# Patient Record
Sex: MALE | Race: WHITE
[De-identification: names, ages, dates, MRNs, and addresses within clinical notes are randomized per-mention and may not be internally consistent; named-entity substitution may affect disease eponyms.]

---

## 2021-12-20 ENCOUNTER — HOSPITAL ENCOUNTER (EMERGENCY)
Dept: HOSPITAL 97 - ER | Age: 61
Discharge: HOME | End: 2021-12-20
Payer: COMMERCIAL

## 2021-12-20 VITALS — SYSTOLIC BLOOD PRESSURE: 146 MMHG | OXYGEN SATURATION: 96 % | DIASTOLIC BLOOD PRESSURE: 82 MMHG

## 2021-12-20 VITALS — TEMPERATURE: 98.1 F

## 2021-12-20 DIAGNOSIS — I10: ICD-10-CM

## 2021-12-20 DIAGNOSIS — N13.2: Primary | ICD-10-CM

## 2021-12-20 LAB
ALBUMIN SERPL BCP-MCNC: 3.4 G/DL (ref 3.4–5)
ALP SERPL-CCNC: 131 U/L (ref 45–117)
ALT SERPL W P-5'-P-CCNC: 34 U/L (ref 12–78)
AST SERPL W P-5'-P-CCNC: 20 U/L (ref 15–37)
BUN BLD-MCNC: 24 MG/DL (ref 7–18)
GLUCOSE SERPLBLD-MCNC: 83 MG/DL (ref 74–106)
HCT VFR BLD CALC: 42.4 % (ref 39.6–49)
INR BLD: 0.91
LYMPHOCYTES # SPEC AUTO: 1.3 K/UL (ref 0.7–4.9)
MAGNESIUM SERPL-MCNC: 2.5 MG/DL (ref 1.8–2.4)
NT-PROBNP SERPL-MCNC: 86 PG/ML (ref ?–125)
PMV BLD: 6.8 FL (ref 7.6–11.3)
POTASSIUM SERPL-SCNC: 4.5 MMOL/L (ref 3.5–5.1)
RBC # BLD: 4.65 M/UL (ref 4.33–5.43)
TROPONIN (EMERG DEPT USE ONLY): < 0.02 NG/ML (ref 0–0.04)

## 2021-12-20 PROCEDURE — 99284 EMERGENCY DEPT VISIT MOD MDM: CPT

## 2021-12-20 PROCEDURE — 71045 X-RAY EXAM CHEST 1 VIEW: CPT

## 2021-12-20 PROCEDURE — 83735 ASSAY OF MAGNESIUM: CPT

## 2021-12-20 PROCEDURE — 81015 MICROSCOPIC EXAM OF URINE: CPT

## 2021-12-20 PROCEDURE — 36415 COLL VENOUS BLD VENIPUNCTURE: CPT

## 2021-12-20 PROCEDURE — 96365 THER/PROPH/DIAG IV INF INIT: CPT

## 2021-12-20 PROCEDURE — 81003 URINALYSIS AUTO W/O SCOPE: CPT

## 2021-12-20 PROCEDURE — 93005 ELECTROCARDIOGRAM TRACING: CPT

## 2021-12-20 PROCEDURE — 74018 RADEX ABDOMEN 1 VIEW: CPT

## 2021-12-20 PROCEDURE — 85610 PROTHROMBIN TIME: CPT

## 2021-12-20 PROCEDURE — 84484 ASSAY OF TROPONIN QUANT: CPT

## 2021-12-20 PROCEDURE — 80048 BASIC METABOLIC PNL TOTAL CA: CPT

## 2021-12-20 PROCEDURE — 83880 ASSAY OF NATRIURETIC PEPTIDE: CPT

## 2021-12-20 PROCEDURE — 85025 COMPLETE CBC W/AUTO DIFF WBC: CPT

## 2021-12-20 PROCEDURE — 80076 HEPATIC FUNCTION PANEL: CPT

## 2021-12-20 NOTE — XMS REPORT
Continuity of Care Document

                          Created on:2021



Patient:MORAIMA LUIS

Sex:Male

:1960

External Reference #:[id number]





Demographics







                          Address                   [address] [place] [address]

 

                          Home Phone                [phone]

 

                          Email Address             [email or web address]

 

                          Preferred Language        Unknown

 

                          Marital Status            Unknown

 

                          Orthodoxy Affiliation     Unknown

 

                          Race                      Unknown

 

                          Additional Race(s)        Unavailable



                                                    Unavailable

 

                          Ethnic Group              Unknown









Author







                          Organization              [facility]

t

 

                          Address                   [address]



                                                    [place] [address]

 

                          Phone                     [phone]









Support







                Name            Relationship    Address         Phone

 

                Unavailable     Unavailable     [address] [place] [address] 









Care Team Providers







                    Name                Role                Phone

 

                    DOMENIC BANSAL      Attending Clinician Unavailable

 

                    HUNDL               Attending Clinician Unavailable

 

                    Domenic Bansal MD   Attending Clinician [phone]

 

                    Doctor Unassigned,  Name Attending Clinician Unavailable

 

                    Only,  Test         Attending Clinician Unavailable

 

                    Pob,  Lab Main      Attending Clinician Unavailable

 

                    DOMENIC BANSAL      Admitting Clinician Unavailable

 

                    Domenic Bansal MD   Admitting Clinician [phone]









Payers







           Payer Name Policy Type Policy Number Effective Date Expiration Date EVI small

 

           [facility]            [id number] 2017 00:00:00            



           [facility]                                                 







Problems







       Condition Condition Condition Status Onset  Resolution Last   Treating Co

mments 

Source



       Name   Details Category        Date   Date   Treatment Clinician        



                                                 Date                 

 

       No known No known Disease                                           Unive

rs



       active active                                                  ity of



       problems problems                                                  [facility]







Allergies, Adverse Reactions, Alerts







       Allergy Allergy Status Severity Reaction(s) Onset  Inactive Treating Comm

ents 

Source



       Name   Type                        Date   Date   Clinician        

 

       NO KNOWN Drug   Active                                           [facility]



       ALLERGIE Class                                                   ity of



       S                                                              [facility]







Social History







           Social Habit Start Date Stop Date  Quantity   Comments   Source

 

           Exposure to                       Not sure              [facility]

 [place]



           SARS-CoV-2 (event)                                             [facility]

l [facility]

 

           Tobacco use and 2021 Never used            [facility]



           exposure   00:00:00   00:00:00                         [facility] [place]

 

           Sex Assigned At 1960                       [facility]



           Birth      00:00:00   00:00:00                         [facility]









                Smoking Status  Start Date      Stop Date       Source

 

                Unknown if ever smoked                                 [facility]

 

                Never smoker                                    [facility]







Medications







       Ordered Filled Start  Stop   Current Ordering Indication Dosage Frequency

 Signature

                    Comments            Components          Source



     Medication Medication Date Date Medication? Clinician                (SIG) 

          



     Name Name                                                   

 

     linezolid      0      Yes            600mg      Take 600           Uni

vers



     600 mg      6-10                               mg by           ity of



     tablet      13:49:                               mouth           [place]



               55                                 every 12           Medical



                                                  (twelve)           Branch



                                                  hours.           

 

     metFORMIN      -0      Yes            500mg      Take 500           Uni

vers



     500 mg 24      6-10                               mg by           ity of



     hr tablet      13:49:                               mouth           [place]



               55                                 daily with           Medical



                                                  breakfast.           Branch

 

     ramipriL 10      0      Yes            10mg      Take 10 mg           

[facility]



     mg capsule      6-10                               by mouth           ity o

f



               13:49:                               daily.           [place]



               55                                                Medical



                                                                 Branch

 

     liraglutide      0      Yes                      inject           Univ

ers



     (VICTOZA      6-10                               under the           ity of



     2-MONICA) 0.6      13:49:                               skin.           [place]



     mg/0.1 mL      55                                                Medical



     (18 mg/3                                                        Branch



     mL)                                                         



     injection                                                        

 

     allopurinoL      -0      Yes            300mg      Take 300           U

nivers



     300 mg      6-10                               mg by           ity of



     tablet      13:49:                               mouth           [place]



               55                                 daily.           Medical



                                                                 Branch

 

     amLODIPine      0      Yes            5mg       Take 5 mg           Un

carole



     5 mg tablet      6-10                               by mouth           ity 

of



               13:49:                               daily.           [place]



               55                                                Medical



                                                                 Branch

 

     carvediloL      0      Yes            12.5mg      Take 12.5           

[facility]



     12.5 mg      6-10                               mg by           ity of



     tablet      13:49:                               mouth 2           [place]



               55                                 (two)           Medical



                                                  times           Branch



                                                  daily with           



                                                  meals.           

 

     linezolid      0      Yes            600mg      Take 600           Uni

vers



     600 mg      6-10                               mg by           ity of



     tablet      13:49:                               mouth           [place]



               55                                 every 12           Medical



                                                  (twelve)           Branch



                                                  hours.           

 

     metFORMIN      0      Yes            500mg      Take 500           Uni

vers



     500 mg 24      6-10                               mg by           ity of



     hr tablet      13:49:                               mouth           [place]



               55                                 daily with           Medical



                                                  breakfast.           Branch

 

     ramipriL 10      0      Yes            10mg      Take 10 mg           

[facility]



     mg capsule      6-10                               by mouth           ity o

f



               13:49:                               daily.           [place]



               55                                                Medical



                                                                 Branch

 

     liraglutide      0      Yes                      inject           Univ

ers



     (VICTOZA      6-10                               under the           ity of



     2-MONICA) 0.6      13:49:                               skin.           [place]



     mg/0.1 mL      55                                                Medical



     (18 mg/3                                                        Branch



     mL)                                                         



     injection                                                        

 

     allopurinoL      -0      Yes            300mg      Take 300           U

nivers



     300 mg      6-10                               mg by           ity of



     tablet      13:49:                               mouth           [place]



               55                                 daily.           Medical



                                                                 Branch

 

     amLODIPine      0      Yes            5mg       Take 5 mg           Un

carole



     5 mg tablet      6-10                               by mouth           ity 

of



               13:49:                               daily.           [place]



               55                                                Medical



                                                                 Branch

 

     carvediloL      0      Yes            12.5mg      Take 12.5           

[facility]



     12.5 mg      6-10                               mg by           ity of



     tablet      13:49:                               mouth 2           [place]



               55                                 (two)           Medical



                                                  times           Branch



                                                  daily with           



                                                  meals.           

 

     linezolid      0      Yes            600mg      Take 600           Uni

vers



     600 mg      6-10                               mg by           ity of



     tablet      13:49:                               mouth           [place]



               55                                 every 12           Medical



                                                  (twelve)           Branch



                                                  hours.           

 

     metFORMIN      0      Yes            500mg      Take 500           Uni

vers



     500 mg 24      6-10                               mg by           ity of



     hr tablet      13:49:                               mouth           [place]



               55                                 daily with           Medical



                                                  breakfast.           Branch

 

     ramipriL 10            Yes            10mg      Take 10 mg           

[facility]



     mg capsule      6-10                               by mouth           ity o

f



               13:49:                               daily.           [place]



               55                                                Medical



                                                                 Branch

 

     liraglutide            Yes                      inject           Univ

ers



     (VICTOZA      6-10                               under the           ity of



     2-MONICA) 0.6      13:49:                               skin.           [place]



     mg/0.1 mL      55                                                Medical



     (18 mg/3                                                        Branch



     mL)                                                         



     injection                                                        

 

     allopurinoL            Yes            300mg      Take 300           U

nivers



     300 mg      6-10                               mg by           ity of



     tablet      13:49:                               mouth           [place]



               55                                 daily.           Medical



                                                                 Branch

 

     amLODIPine      0      Yes            5mg       Take 5 mg           Un

carole



     5 mg tablet      6-10                               by mouth           ity 

of



               13:49:                               daily.           [place]



               55                                                Medical



                                                                 Branch

 

     carvediloL            Yes            12.5mg      Take 12.5           

[facility]



     12.5 mg      6-10                               mg by           ity of



     tablet      13:49:                               mouth 2           [place]



               55                                 (two)           Medical



                                                  times           [place]



                                                  daily with           



                                                  meals.           

 

     tetracaine      0      Yes                      PRN,           [facility]



     (PONTOCAINE      6-10                               Starting           ity 

of



     ) 0.5 %      13:15:                               Thu            [place]



     ophthalmic      00                                 6/10/21 at           Med

ical



     drops                                         0815,           Branch



                                                  Until           



                                                  Discontinu           



                                                  ed,            



                                                  Routine,           



                                                  Intra-op           

 

     tetracaine      -2021- No                       PRN,           Univer

s



     (PONTOCAINE      6-10 06-10                          Starting           ity

 of



     ) 0.5 %      13:15: 15:54                          Thu            [place]



     ophthalmic      00   :58                           6/10/21 at           Med

ical



     drops                                         0815,           [place]



                                                  Until Thu           



                                                  6/10/21 at           



                                                  1054,           



                                                  Routine,           



                                                  Intra-op           

 

     neomycin-po      -0      Yes                      PRN,           Univer

s



     lymyxin-dex      -10                               Starting           ity 

of



     amethasone      13:14:                               Thu            [place]



     (MAXITROL)      00                                 6/10/21 at           Med

ical



     [address]



     mg/g-10,000                                         Until           



     unit/g-0.1                                         Discontinu           



     %                                            ed,            



     ophthalmic                                         Routine,           



     ointment                                         Intra-op           

 

     gentamicin      -0      Yes                      PRN,           [facility]



     injection      -10                               Starting           ity of



               13:14:                               Thu            [place]



               00                                 6/10/21 at           [address]



                                                  Until           



                                                  Discontinu           



                                                  ed, ASAP,           



                                                  Intra-op           

 

     neomycin-po      -0 - No                       PRN,           Unive

rs



     lymyxin-dex      6-10 06-10                          Starting           ity

 of



     amethasone      13:14: 15:54                          Thu            [place]



     (MAXITROL)      00   :58                           6/10/21 at           Med

ical



     [address]



     mg/g-10,000                                         Until Thu           



     unit/g-0.1                                         6/10/21 at           



     %                                            1054,           



     ophthalmic                                         Routine,           



     ointment                                         Intra-op           

 

     gentamicin      -0 - No                       PRN,           Univer

s



     injection      6-10 06-10                          Starting           ity o

f



               13:14: 15:54                          Thu            [place]



               00   :58                           6/10/21 at           [address]



                                                  Until Thu           



                                                  6/10/21 at           



                                                  1054,           



                                                  ASAP,           



                                                  Intra-op           

 

     balanced      -0      Yes                      PRN,           [facility]



     salt soln      6-10                               Starting           ity of



     no.2 irrig.      13:01:                               Thu            [place]



     (BSS)      00                                 6/10/21 at           [address]



     solution                                         Until           



                                                  Discontinu           



                                                  ed,            



                                                  Routine,           



                                                  Intra-op           

 

     ceFAZolin            Yes                      PRN,           [facility]



     (ANCEF)      6-10                               Starting           ity of



     injection      13:01:                               Thu            [place]



               00                                 6/10/21 at           [address]



                                                  Until           



                                                  Discontinu           



                                                  ed, ASAP,           



                                                  Intra-op           

 

     dexamethaso            Yes                      PRN,           Univer

s



     ne        -10                               Starting           ity of



     (DECADRON      13:01:                               Thu            [place]



     PHOSPHATE)      00                                 6/10/21 at           [facility]

ical



     injection                                         [address] [place] [address]



                                                  Until           



                                                  Discontinu           



                                                  ed,            



                                                  Routine,           



                                                  Intra-op           

 

     DUOVISC            Yes                      PRN,           [facility]



     (DUOVISC      6-10                               Starting           ity of



     VISCO      13:01:                               Thu            [place]



     ELASTIC) 3      00                                 6/10/21 at           Med

ical



     %-4 %(0.5                                         0801,           Branch



     mL) 1 %                                         Until           



     (0.55 mL)                                         Discontinu           



     intraocular                                         ed,            



     injection                                         Routine,           



                                                  Intra-op           

 

     NaCl 0.9%            Yes                      PRN,           [facility]



     (NS)      6-10                               Starting           ity of



     injection      13:01:                               Thu            [place]



               00                                 6/10/21 at           [place] [address],           [place]



                                                  Until           



                                                  Discontinu           



                                                  ed,            



                                                  Routine,           



                                                  Intra-op           

 

     EPINEPHrine            Yes                      PRN,           Univer

s



     (PF)      6-10                               Starting           ity of



     1:1,000 (1      13:01:                               Thu            [place]



     mg/mL)      00                                 6/10/21 at           [facility]



     (ADRENALIN                                         08,           [place]



     (PF))                                         Until           



     injection                                         Discontinu           



                                                  ed,            



                                                  Routine,           



                                                  Intra-op           

 

     balanced      2021- No                       PRN,           [facility]



     salt soln      6-10 06-10                          Starting           ity o

f



     no.2 irrig.      13:01: 15:54                          Thu            [place]



     (BSS)      00   :58                           6/10/21 at           [facility]



     ophthalmic                                         08,           [place]



     solution                                         Until Thu           



                                                  6/10/21 at           



                                                  1054,           



                                                  Routine,           



                                                  Intra-op           

 

     ceFAZolin      2021- No                       PRN,           [facility]



     (ANCEF)      6-10 06-10                          Starting           ity of



     injection      13:01: 15:54                          Thu            [place]



               00   :58                           6/10/21 at           [place] [address],           Branch



                                                  Until Thu           



                                                  6/10/21 at           



                                                  1054,           



                                                  ASAP,           



                                                  Intra-op           

 

     dexamethaso      2021- No                       PRN,           Unive

rs



     ne        6-10 06-10                          Starting           ity of



     (DECADRON      13:01: 15:54                          Thu            [place]



     PHOSPHATE)      00   :58                           6/10/21 at           [facility]

ical



     injection                                         08,           Branch



                                                  Until Thu           



                                                  6/10/21 at           



                                                  1054,           



                                                  Routine,           



                                                  Intra-op           

 

     DUOVISC      2021- No                       PRN,           [facility]



     (DUOVISC      6-10 06-10                          Starting           ity of



     VISCO      13:01: 15:54                          Thu            [place]



     ELASTIC) 3      00   :58                           6/10/21 at           Med

ical



     %-4 %(0.5                                         08,           Branch



     mL) 1 %                                         Until Thu           



     (0.55 mL)                                         6/10/21 at           



     intraocular                                         1054,           



     injection                                         Routine,           



                                                  Intra-op           

 

     NaCl 0.9%      2021- No                       PRN,           [facility]



     (NS)      6-10 06-10                          Starting           ity of



     injection      13:01: 15:54                          Thu            [place]



               00   :58                           6/10/21 at           Medical



                                                  0801,           Branch



                                                  Until Thu           



                                                  6/10/21 at           



                                                  1054,           



                                                  Routine,           



                                                  Intra-op           

 

     EPINEPHrine      2021- No                       PRN,           Unive

rs



     (PF)      6-10 06-10                          Starting           ity of



     1:1,000 (1      13:01: 15:54                          Thu            [place]



     mg/mL)      00   :58                           6/10/21 at           [facility]



     (ADRENALIN                                         0801,           [place]



     (PF))                                         Until Thu           



     injection                                         6/10/21 at           



                                                  1054,           



                                                  Routine,           



                                                  Intra-op           

 

     water for            Yes                      PRN,           [facility]



     irrigation      6-10                               Starting           ity o

f



     irrigation      12:58:                               Thu            [place]



     solution      00                                 6/10/21 at           Medic

al



                                                  [facility],           [place]



                                                  Until           



                                                  Discontinu           



                                                  ed,            



                                                  Routine,           



                                                  Intra-op           

 

     water for      2021- No                       PRN,           [facility]



     irrigation      6-10 06-10                          Starting           ity 

of



     irrigation      12:58: 15:54                          Thu            [place]



     solution      00   :58                           6/10/21 at           Medic

al



                                                  0758,           [place]



                                                  Until Thu           



                                                  6/10/21 at           



                                                  1054,           



                                                  Routine,           



                                                  Intra-op           

 

     eye block            Yes                      PRN,           [facility]



     syringe 11      10                               Starting           ity o

f



     mL        12:54:                               Thu            [place]



               00                                 6/10/21 at           Medical



                                                  0754,           [place]



                                                  Until           



                                                  Discontinu           



                                                  ed,            



                                                  Intra-op           

 

     eye block      2021- No                       PRN,           [facility]



     syringe 11      6-10 06-10                          Starting           ity 

of



     mL        12:54: 15:54                          Thu            [place]



               00   :58                           6/10/21 at           Medical



                                                  0754,           [place]



                                                  Until Thu           



                                                  6/10/21 at           



                                                  1054,           



                                                  Intra-op           

 

     mydriatic      2021- No             .5mL      0.5 mL,           Univ

ers



     #5        6-10 06-10                          Left Eye,           ity of



     ophthalmic      12:15: 12:13                          ONCE, 1           [place]

as



     solution      00   :00                           dose, Thu           [facility]ica

l



     0.5 mL                                         6/10/21 at           [facility]



     syringe                                         0715,           



                                                  Routine,           



                                                  [facility] Pre-op           

 

     lactated      -0 202- No             1000mL      at 42           Unive

rs



     ringers IV      6-10 06-10                          mL/hr,           ity of



     infusion      12:15: 12:14                          1,000 mL,           [facility]

as



     1,000 mL      00   :00                           IV             Medical



                                                  Infusion,           [facility]



                                                  ONCE, 1           



                                                  dose, Thu           



                                                  6/10/21 at           



                                                  0715,           



                                                  Routine,           



                                                  [facility] Pre-op           

 

     mydriatic      2021- No             .5mL      0.5 mL,           Univ

ers



     #5        6-10 06-10                          Left Eye,           ity of



     ophthalmic      12:15: 12:13                          ONCE, 1           [facility]

as



     solution      00   :00                           dose, Thu           Medica

l



     0.5 mL                                         6/10/21 at           [facility]



     syringe                                         0715,           



                                                  Routine,           



                                                  DS[facility] Pre-op           

 

     lactated      -0 - No             1000mL      at 42           Unive

rs



     ringers IV      6-10 06-10                          mL/hr,           ity of



     infusion      12:15: 12:14                          1,000 mL,           [facility]

as



     1,000 mL      00   :00                           IV             Medical



                                                  Infusion,           [facility]



                                                  ONCE, 1           



                                                  dose, Thu           



                                                  6/10/21 at           



                                                  0715,           



                                                  Routine,           



                                                  DS[facility] Pre-op           

 

     carvediloL      0      Yes            12.5mg      Take 12.5           

[facility]



     12.5 mg      5-13                               mg by           ity of



     tablet      15:18:                               mouth 2           [place]



               29                                 (two)           Medical



                                                  times           Branch



                                                  daily with           



                                                  meals.           

 

     linezolid            Yes            600mg      Take 600           Uni

vers



     600 mg      5-13                               mg by           ity of



     tablet      15:18:                               mouth           [place]



               29                                 every 12           Medical



                                                  (twelve)           Branch



                                                  hours.           

 

     metFORMIN      0      Yes            500mg      Take 500           Uni

vers



     500 mg 24      5-13                               mg by           ity of



     hr tablet      15:18:                               mouth           [place]



               29                                 daily with           Medical



                                                  breakfast.           Branch

 

     ramipriL 10      0      Yes            10mg      Take 10 mg           

[facility]



     mg capsule      5-13                               by mouth           ity o

f



               15:18:                               daily.           [place]



               29                                                Medical



                                                                 Branch

 

     liraglutide      0      Yes                      inject           Univ

ers



     (VICTOZA      5-13                               under the           ity of



     2-MONICA) 0.6      15:18:                               skin.           [place]



     mg/0.1 mL      29                                                Medical



     (18 mg/3                                                        Branch



     mL)                                                         



     injection                                                        

 

     allopurinoL      -0      Yes            300mg      Take 300           U

nivers



     300 mg      5-13                               mg by           ity of



     tablet      15:18:                               mouth           [place]



               29                                 daily.           Medical



                                                                 Branch

 

     amLODIPine      -0      Yes            5mg       Take 5 mg           Un

carole



     5 mg tablet      5-13                               by mouth           ity 

of



               15:18:                               daily.           [place]



               29                                                Medical



                                                                 Branch

 

     carvediloL      -0      Yes            12.5mg      Take 12.5           

[facility]



     12.5 mg      5-13                               mg by           ity of



     tablet      15:18:                               mouth 2           [place]



               29                                 (two)           Medical



                                                  times           Branch



                                                  daily with           



                                                  meals.           

 

     linezolid      -0      Yes            600mg      Take 600           Uni

vers



     600 mg      5-13                               mg by           ity of



     tablet      15:18:                               mouth           [place]



               29                                 every 12           Medical



                                                  (twelve)           Branch



                                                  hours.           

 

     metFORMIN      -0      Yes            500mg      Take 500           Uni

vers



     500 mg 24      5-13                               mg by           ity of



     hr tablet      15:18:                               mouth           [place]



               29                                 daily with           Medical



                                                  breakfast.           Branch

 

     ramipriL 10      -0      Yes            10mg      Take 10 mg           

[facility]



     mg capsule      5-13                               by mouth           ity o

f



               15:18:                               daily.           [address]



                                                                 Branch

 

     liraglutide      0      Yes                      inject           Univ

ers



     (VICTOZA      5-13                               under the           ity of



     2-MONICA) 0.6      15:18:                               skin.           [place]



     mg/0.1 mL      29                                                Medical



     (18 mg/3                                                        Branch



     mL)                                                         



     injection                                                        

 

     allopurinoL      -0      Yes            300mg      Take 300           U

nivers



     300 mg      5-13                               mg by           ity of



     tablet      15:18:                               mouth           [place]



               29                                 daily.           Medical



                                                                 Branch

 

     amLODIPine      -0      Yes            5mg       Take 5 mg           Un

carole



     5 mg tablet      5-13                               by mouth           ity 

of



               15:18:                               daily.           [address]



                                                                 Branch

 

     carvediloL      -0      Yes            12.5mg      Take 12.5           

[facility]



     12.5 mg      5-13                               mg by           ity of



     tablet      15:18:                               mouth 2           [place]



               29                                 (two)           Medical



                                                  times           Branch



                                                  daily with           



                                                  meals.           

 

     linezolid      -0      Yes            600mg      Take 600           Uni

vers



     600 mg      5-13                               mg by           ity of



     tablet      15:18:                               mouth           [place]



               29                                 every 12           Medical



                                                  (twelve)           Branch



                                                  hours.           

 

     metFORMIN      -0      Yes            500mg      Take 500           Uni

vers



     500 mg 24      5-13                               mg by           ity of



     hr tablet      15:18:                               mouth           [place]



               29                                 daily with           Medical



                                                  breakfast.           Branch

 

     ramipriL 10      -0      Yes            10mg      Take 10 mg           

[facility]



     mg capsule      5-13                               by mouth           ity o

f



               15:18:                               daily.           [address]

 

     liraglutide            Yes                      inject           Univ

ers



     (VICTOZA      5-13                               under the           ity of



     2-MONICA) 0.6      15:18:                               skin.           [place]



     mg/0.1 mL                                                      Medical



     (18 mg/3                                                        Branch



     mL)                                                         



     injection                                                        

 

     allopurinoL            Yes            300mg      Take 300           U

nivers



     300 mg      5-13                               mg by           ity of



     tablet      15:18:                               mouth           [place]



               29                                 daily.           [facility]



                                                                 Branch

 

     amLODIPine            Yes            5mg       Take 5 mg           Un

carole



     5 mg tablet      5-13                               by mouth           ity 

of



               15:18:                               daily.           [address]

 

     water for            Yes                      PRN,           [facility]



     irrigation      5-13                               Starting           ity o

f



     irrigation      14:42:                               Thu            [place]



     solution      21 at           Medic

al



                                                  0942,           Branch



                                                  Until           



                                                  Discontinu           



                                                  ed,            



                                                  Routine,           



                                                  Intra-op           

 

     NaCl 0.9%            Yes                      PRN,           [facility]



     (NS)      5-                               Starting           ity of



     injection      14:42:                               Thu            [place]



               21 at           [address]



                                                  Until           



                                                  Discontinu           



                                                  ed,            



                                                  Routine,           



                                                  Intra-op           

 

     neomycin-po            Yes                      PRN,           Univer

s



     lymyxin-dex      -                               Starting           ity 

of



     amethasone      14:42:                               Thu            [place]



     (MAXITROL)      21 at           Med

ical



     3.5                                          [address] [place] [address]



     mg/g-10,000                                         Until           



     unit/g-0.1                                         Discontinu           



     %                                            ed,            



     ophthalmic                                         Routine,           



     ointment                                         Intra-op           

 

     gentamicin            Yes                      PRN,           [facility]



     injection      -                               Starting           ity of



               14:42:                               Thu            [place]



               00                                 21 at           [address]



                                                  Until           



                                                  Discontinu           



                                                  ed, ASAP,           



                                                  Intra-op           

 

     EPINEPHrine            Yes                      PRN,           Univer

s



     (PF)      5-                               Starting           ity of



     1:1,000 (1      14:42:                               Thu            [place]



     mg/mL)      21 at           [facility]



     (ADRENALIN                                         [address] [place] [address]



     (PF))                                         Until           



     injection                                         Discontinu           



                                                  ed,            



                                                  Routine,           



                                                  Intra-op           

 

     DUOVISC            Yes                      PRN,           [facility]



     (DUOVISC      5-13                               Starting           ity of



     VISCO      14:42:                               Thu            [place]



     ELASTIC) 3      21 at           Med

ical



     %-4 %(0.5                                         [address] [place] [address]



     mL) 1 %                                         Until           



     (0.55 mL)                                         Discontinu           



     intraocular                                         ed,            



     injection                                         Routine,           



                                                  Intra-op           

 

     dexamethaso      0      Yes                      PRN,           Univer

s



     ne        13                               Starting           ity of



     (DECADRON      14:42:                               Thu            [place]



     PHOSPHATE)      00                                 21 at           Med

ical



     injection                                         0942,           Branch



                                                  Until           



                                                  Discontinu           



                                                  ed,            



                                                  Routine,           



                                                  Intra-op           

 

     dexamethaso      2021- No                       PRN,           Unive

rs



     ne        13                          Starting           ity of



     (DECADRON      14:42: 17:18                          Thu            [place]



     PHOSPHATE)      00   :30                           21 at           Med

ical



     injection                                         0942,           Branch



                                                  Until u           



                                                  21 at           



                                                  1218,           



                                                  Routine,           



                                                  Intra-op           

 

     ondansetron            Yes            4mg       4 mg, Slow           

[facility]



     (ZOFRAN                                     IV Push,           ity of



     (PF))      14:31:                               PRN, 1           [place]



     injection 4      20                                 dose,           Medical



     mg                                           Starting           [facility]



                                                  u            



                                                  21 at           



                                                  0931,           



                                                  Until           



                                                  Discontinu           



                                                  ed,            



                                                  Routine,           



                                                  Nausea and           



                                                  Vomiting           



                                                  (N/V),           



                                                  PACU           

 

     FENTanyl PF            Yes            25ug      25 mcg,           Uni

vers



     (SUBLIMAZE                                     Slow IV           ity of



     (PF))      14:31:                               Push,           [place]



     injection      20                                 Q5MIN PRN,           Medi

oumou



     25 mcg                                         4 doses,           [facility]



                                                  Starting           



                                                  u            



                                                  21 at           



                                                  0931,           



                                                  Until           



                                                  Discontinu           



                                                  ed,            



                                                  Routine,           



                                                  Pain           



                                                  (scale           



                                                  4-6), PACU           

 

     ondansetron      2021- No             4mg       4 mg, Slow          

 [facility]



     (ZOFRAN                                IV Push,           ity of



     (PF))      14:31: 17:18                          PRN, 1           [place]



     injection 4      20   :30                           dose,           Medical



     mg                                           Starting           [facility]



                                                  u            



                                                  21 at           



                                                  0931,           



                                                  Until Thu           



                                                  21 at           



                                                  1218,           



                                                  Routine,           



                                                  Nausea and           



                                                  Vomiting           



                                                  (N/V),           



                                                  PACU           

 

     FENTanyl PF      2021- No             25ug      25 mcg,           Un

carole



     (SUBLIMAZE                                Slow IV           ity o

f



     (PF))      14:31: 17:18                          Push,           [place]



     injection      20   :30                           Q5MIN PRN,           Medi

oumou



     25 mcg                                         4 doses,           [facility]



                                                  Starting           



                                                  u            



                                                  21 at           



                                                  0931,           



                                                  Until Thu           



                                                  21 at           



                                                  1218,           



                                                  Routine,           



                                                  Pain           



                                                  (scale           



                                                  4-6), PACU           

 

     balanced      0      Yes                      PRN,           [facility]



     salt soln      5-13                               Starting           ity of



     no.2 irrig.      14:16:                               Thu            [place]



     (BSS)      00                                 21 at           [address]



     solution                                         Until           



                                                  Discontinu           



                                                  ed,            



                                                  Routine,           



                                                  Intra-op           

 

     ceFAZolin      0      Yes                      PRN,           [facility]



     (ANCEF)      5-13                               Starting           ity of



     injection      14:16:                               Thu            [place]



               21 at           [address]



                                                  Until           



                                                  Discontinu           



                                                  ed, ASAP,           



                                                  Intra-op           

 

     balanced      0      Yes                      PRN,           [facility]



     salt soln      5-13                               Starting           ity of



     no.2 irrig.      14:16:                               Thu            [place]



     (BSS)      21 at           [address]



     solution                                         Until           



                                                  Discontinu           



                                                  ed,            



                                                  Routine,           



                                                  Intra-op           

 

     ceFAZolin      -2021- No                       PRN,           [facility]



     (ANCEF)       05-                          Starting           ity of



     injection      14:16: 17:18                          Thu            [place]



               00   :30                           21 at           [address]



                                                  Until u           



                                                  21 at           



                                                  1218,           



                                                  ASAP,           



                                                  Intra-op           

 

     balanced      2021- No                       PRN,           [facility]



     salt soln      -                          Starting           ity o

f



     no.2 irrig.      14:16: 17:18                          Thu            [place]



     (BSS)      00   :30                           21 at           [address]



     solution                                         Until u           



                                                  21 at           



                                                  1218,           



                                                  Routine,           



                                                  Intra-op           

 

     tetracaine      0      Yes                      PRN,           [facility]



     (PONTOCAINE                                     Starting           ity 

of



     ) 0.5 %      14:10:                               u            [place]



     ophthalmic      00                                 21 at           [facility]

ical



     drops                                         [address] [place] [address]



                                                  Until           



                                                  Discontinu           



                                                  ed,            



                                                  Routine,           



                                                  Intra-op           

 

     eye block      -0      Yes                      PRN,           [facility]



     syringe 11      5-13                               Starting           ity o

f



     mL        14:10:                               Thu            [place]



               21 at           [address]



                                                  Until           



                                                  Discontinu           



                                                  ed,            



                                                  Intra-op           

 

     eye block      -0 - No                       PRN,           [facility]



     syringe 11      5- 05-13                          Starting           ity 

of



     mL        14:10: 17:18                          Thu            [place]



               00   :30                           21 at           Medical



                                                  0910,           Branch



                                                  Until Thu           



                                                  21 at           



                                                  1218,           



                                                  Intra-op           

 

     mydriatic      2021- No             .5mL      0.5 mL,           Univ

ers



     #5                                  Right Eye,           ity of



     ophthalmic      13:15: 13:22                          ONCE, 1           [place]

as



     solution      00   :00                           dose, Thu           Medica

l



     0.5 mL                                         21 at           [facility]



     syringe                                         0815,           



                                                  Routine,           



                                                  DSU Pre-op           

 

     lactated      2021- No             1000mL      at 42           Unive

rs



     ringers IV      -                          mL/hr,           ity of



     infusion      13:15: :22                          1,000 mL,           [facility]

as



     1,000 mL      00   :00                           IV             Medical



                                                  Infusion,           [facility]



                                                  ONCE, 1           



                                                  dose, Thu           



                                                  21 at           



                                                  0815,           



                                                  Routine,           



                                                  DSU Pre-op           

 

     mydriatic      2021- No             .5mL      0.5 mL,           Univ

ers



     #5                                  Right Eye,           ity of



     ophthalmic      13:15: :22                          ONCE, 1           [facility]

as



     solution      00   :00                           dose, Thu           Medica

l



     0.5 mL                                         21 at           Branch



     syringe                                         0815,           



                                                  Routine,           



                                                  DSU Pre-op           

 

     lactated      2021- No             1000mL      at 42           Unive

rs



     ringers IV      -                          mL/hr,           ity of



     infusion      13:15: :22                          1,000 mL,           [facility]

as



     1,000 mL      00   :00                           IV             Medical



                                                  Infusion,           Branch



                                                  ONCE, 1           



                                                  dose, Thu           



                                                  21 at           



                                                  0815,           



                                                  Routine,           



                                                  DSU Pre-op           

 

     ramipriL 10            Yes            10mg      Take 10 mg           

[facility]



     mg capsule      5-11                               by mouth           ity o

f



               13:45:                               daily.           [place]



               05                                                Medical



                                                                 Branch

 

     liraglutide            Yes                      inject           Univ

ers



     (VICTOZA      5-11                               under the           ity of



     2-MONICA) 0.6      13:45:                               skin.           [place]



     mg/0.1 mL      05                                                Medical



     (18 mg/3                                                        Branch



     mL)                                                         



     injection                                                        

 

     allopurinoL            Yes            300mg      Take 300           U

nivers



     300 mg      5-11                               mg by           ity of



     tablet      13:45:                               mouth           [place]



               04                                 daily.           Medical



                                                                 Branch

 

     amLODIPine      0      Yes            5mg       Take 5 mg           Un

carole



     5 mg tablet      5-11                               by mouth           ity 

of



               13:45:                               daily.           [place]



               04                                                Medical



                                                                 Branch

 

     carvediloL            Yes            12.5mg      Take 12.5           

[facility]



     12.5 mg      5-11                               mg by           ity of



     tablet      13:45:                               mouth 2           [place]



               04                                 (two)           Medical



                                                  times           Branch



                                                  daily with           



                                                  meals.           

 

     linezolid            Yes            600mg      Take 600           Uni

vers



     600 mg      5-11                               mg by           ity of



     tablet      13:45:                               mouth           [place]



               04                                 every 12           Medical



                                                  (twelve)           Branch



                                                  hours.           

 

     metFORMIN            Yes            500mg      Take 500           Uni

vers



     500 mg 24      5-11                               mg by           ity of



     hr tablet      13:45:                               mouth           [place]



               04                                 daily with           Medical



                                                  breakfast.           Branch







Immunizations







           Ordered    Filled Immunization Date       Status     Comments   [facility]



           Immunization Name Name                                        

 

           SARS-COV-2 COVID-19            2021 Completed             Unive

rsity of



           MODERNA VACCINE            00:00:00                         [facility]

 

           SARS-COV-2 COVID-19            2021 Completed             Unive

rsity of



           MODERNA VACCINE            00:00:00                         [facility]

 

           SARS-COV-2 COVID-19            2021 Completed             Unive

rsity of



           MODERNA VACCINE            00:00:00                         [facility]

 

           SARS-COV-2 COVID-19            2021 Completed             Unive

rsity of



           MODERNA VACCINE            00:00:00                         [facility]

 

           SARS-COV-2 COVID-19            2021 Completed             Unive

rsity of



           MODERNA VACCINE            00:00:00                         [facility]

 

           SARS-COV-2 COVID-19            2021 Completed             Unive

rsity of



           MODERNA VACCINE            00:00:00                         [facility]







Vital Signs







             Vital Name   Observation Time Observation Value Comments     Source

 

             Systolic blood 2021-06-10 13:36:00 121 mm[Hg]                Univer

sity of



             pressure                                            [facility]

 

             Diastolic blood 2021-06-10 13:36:00 75 mm[Hg]                 Unive

rsity of



             pressure                                            [facility]

 

             Heart rate   2021-06-10 13:36:00 62 /min                   Univers[facility]

ty of



                                                                 [facility]

 

             Respiratory rate 2021-06-10 13:36:00 13 /min                   Univ

ersity of



                                                                 [facility]

 

             Oxygen saturation in 2021-06-10 13:36:00 99 /min                   

[facility]



             Arterial blood by                                        [place] [facility]

oumou



             Pulse oximetry                                        Branch

 

             Body temperature 2021-06-10 13:21:00 36.67 Shannon                 Univ

ersity of



                                                                 [place] [facility]



                                                                 Branch

 

             Body height  2021 16:51:00 167.6 cm                  [facility]

ty of



                                                                 [place] [facility]



                                                                 Branch

 

             Body weight  2021 16:51:00 102 kg                    [facility]

ty of



                                                                 [place] [facility]



                                                                 Branch

 

             BMI          2021 16:51:00 36.31 kg/m2               [facility]

ty of



                                                                 [place] [facility]



                                                                 Branch

 

             Systolic blood 2021-06-10 13:36:00 121 mm[Hg]                Univer

sity of



             pressure                                            [place] [facility]



                                                                 Branch

 

             Diastolic blood 2021-06-10 13:36:00 75 mm[Hg]                 Unive

rsity of



             pressure                                            [place] [facility]



                                                                 Branch

 

             Heart rate   2021-06-10 13:36:00 62 /min                   [facility]

ty of



                                                                 [place] [facility]



                                                                 Branch

 

             Respiratory rate 2021-06-10 13:36:00 13 /min                   Univ

ersity of



                                                                 [place] [facility]



                                                                 Branch

 

             Oxygen saturation in 2021-06-10 13:36:00 99 /min                   

[facility]



             Arterial blood by                                        [place] [facility]

oumou



             Pulse oximetry                                        Branch

 

             Body temperature 2021-06-10 13:21:00 36.67 Shannon                 Univ

ersity of



                                                                 [place] [facility]



                                                                 Branch

 

             Body height  2021 16:51:00 167.6 cm                  [facility]

ty of



                                                                 [place] [facility]



                                                                 Branch

 

             Body weight  2021 16:51:00 102 kg                    [facility]

ty of



                                                                 [place] [facility]



                                                                 Branch

 

             BMI          2021 16:51:00 36.31 kg/m2               [facility]

ty of



                                                                 [place] [facility]



                                                                 Branch

 

             Systolic blood 2021 15:00:00 140 mm[Hg]                Univer

sity of



             pressure                                            [place] [facility]



                                                                 Branch

 

             Diastolic blood 2021 15:00:00 81 mm[Hg]                 Unive

rsity of



             pressure                                            [place] [facility]



                                                                 Branch

 

             Heart rate   2021 15:00:00 73 /min                   [facility]

ty of



                                                                 [place] [facility]



                                                                 Branch

 

             Respiratory rate 2021 15:00:00 14 /min                   Univ

ersity of



                                                                 [place] [facility]



                                                                 Branch

 

             Oxygen saturation in 2021 15:00:00 98 /min                   

[facility]



             Arterial blood by                                        [place] [facility]

oumou



             Pulse oximetry                                        Branch

 

             Body temperature 2021 14:30:00 36.83 Shannon                 Univ

ersity of



                                                                 [place] [facility]



                                                                 Branch

 

             Body height  2021 15:33:00 167.6 cm                  [facility]

ty of



                                                                 [place] [facility]



                                                                 Branch

 

             Body weight  2021 15:33:00 102 kg                    [facility]

 

             BMI          2021 15:33:00 36.29 kg/m2               [facility]

 

             Systolic blood 2021 15:00:00 140 mm[Hg]                Univer

sity of



             pressure                                            [facility]

 

             Diastolic blood 2021 15:00:00 81 mm[Hg]                 Unive

rsity of



             pressure                                            [facility]

 

             Heart rate   2021 15:00:00 73 /min                   [facility]

 

             Respiratory rate 2021 15:00:00 14 /min                   [facility]

 

             Oxygen saturation in 2021 15:00:00 98 /min                   

[facility]



             Arterial blood by                                        [place] [facility]

oumou



             Pulse oximetry                                        [place]

 

             Body temperature 2021 14:30:00 36.83 Shannon                 [facility]

 

             Body height  2021 15:33:00 167.6 cm                  [facility]

 

             Body weight  2021 15:33:00 102 kg                    [facility]

 

             BMI          2021 15:33:00 36.29 kg/m2               [facility]







Procedures







                Procedure       Date / Time     Performing      Source



                                Performed       Clinician       

 

                PHACOEMULSIFICATION CATARACT 2021-06-10      Maritza Bansal   [facility]

[facility] [place]



                                12:45:00        [facility] SURGERY - [facility] 2021-06-10      Doctor Unassigned, [facility]



                                05:01:00        [place]         [facility]

 

                PHACOEMULSIFICATION OF 2021      Maritza Bansal   [facility]



                CATARACT WITH INTRAOCULAR 14:01:00        Domenic Kingsley

l Branch



                LENS IMPLANT                                    

 

                POCT GLUCOSE(AGE >30DAYS) 2021      Yanelis Schroeder [facility]



                                13:20:00                        [facility]

 

                POCT GLUCOSE(AGE >30DAYS) 2021      Yanelis Schroeder [facility]



                                13:20:00                        [facility]

 

                POCT GLUCOSE (AUTOMATED) 2021      Maritza Bansal   [facility]



                                13:17:00        [facility]

 

                POCT GLUCOSE (AUTOMATED) 2021      Maritza Bansal   [facility]



                                13:17:00        [facility]

 

                DAY SURGERY - ADC 2021      Doctor Unassigned, [facility]



                                05:01:00        [place]         [facility]

 

                ASSIGNMENT OF BENEFITS 2021      Doctor Unassigned, [facility]



                                16:44:22        [place]         [facility]







Encounters







        Start   End     Encounter Admission Attending Care    Care    Encounter 

Source



        Date/Time Date/Time Type    Type    Clinicians Facility Department ID   

   

 

        2021-10-31         [facility]     [id number]

 [facility]



        22:29:47                         MARITZA                         john [facility]

 

        2021-10-31         [facility]     [id number]

 Univers



        17:00:13                         MARITZA mancia [facility]

 

        2021-10-07 2021-10-07 Outpatient         ROSEY OLIVA  [id number]

[id number] Rosey



        00:00:00 00:00:00                 IZABELLA hernández

 

        2021-10-07 2021-10-07 Outpatient         ROSEY OLIVA  [id number]

92 Rosey



        00:00:00 00:00:00                 IZABELLA hernández

 

        2021-06-10 2021-06-10 [facility]    1.2.840.114 [id number]

425 Univers



        07:06:00 08:45:00 Encounter         Maritza Ordonez 350.1.13.10        

 ity of



                                        [place] [id number]         [place]

[place]



                                                Surgical [phone]         [facility]  [address]             Branch

 

        2021-06-10 2021-06-10 Surgery         [facility]    1.2.840.114 [id number]

76 Univers



        08:00:00 08:39:00                 Maritza Ordonez 350.1.13.10         i

ty of



                                        [place] [id number]         [place]

[place]



                                                Surgical [phone]         [facility]  [address]             Branch

 

        2021-06-10 2021-06-10 Orders          Doctor IBARRA    1.2.840.114 [id number]

36 Univers



        00:00:00 00:00:00 Only            UnassignedVANNESA   350.1.13.10       

  ity of



                                        [place] [facility] [id number]         Nikko

as



                                                        [phone]         Medi

oumou



                                                        [address]             Branch

 

        2021 Outpatient R               [facility]    [id number]

-20 Univers



        10:30:00 10:30:00                                         [id number]  ity of



                                                                        [facility]

 

        20[facility]         [facility]    1.2.840.114 [id number]

368 Univers



        08:04:00 10:18:00 Encounter         Maritza Ordonez 350.1.13.10        

 ity of



                                        [place]omenic [place] 4.2.7.2.686         [place]

[place]



                                                Surgical [phone]         [facility]  [address]             Branch

 

        2021 Surgery         [facility]    1.2.840.114 [id number]

42 Univers



        09:30:00 10:06:00                 Maritza Ordonez 350.1.13.10         i

ty of



                                        [place] 4.2.7.2.686         [place]

[place]



                                                Surgical [phone]         [facility]  [address]             Branch

 

        2021 Orders          Doctor  STACEY    1.2.840.114 [id number]

44 Univers



        00:00:00 00:00:00 Only            Unassigned, VANNESA   350.1.13.10       

  ity of



                                        [place] [facility] [id number]         Nikko

as



                                                        [phone]         Medi

oumou



                                                        [address]             Branch

 

        2021 Outpatient R       ALANA   [facility]    [id number]

298 Univers



        10:00:00 10:00:00                 MARITZA mancia [facility]

 

        2021 Laboratory         Only, Adc Test [facility]    1.2.840.

114 [id number] 

Univers



        09:36:40 09:51:40 Only            Maritza Bansal 350.1.1

3.10         ity of



                                                [place] 4.2.7.2.686         [place]

[place]



                                                [place]  [phone]         Medi

oumou



                                                        [address]             Branch

 

        2021 Technician         Marry, Adc Lab Main [facility]    1.2.8

40.114 [id number] 

Univers



        11:45:18 12:00:18 Visit           Maritza Bansal 350.1.1

3.10         ity of



                                                [place] 4.2.7.2.686         [place]

[place]



                                                [place] [phone]         [facility]     353             [facility]                 

 

        2021 Outpatient R       ALANA   [facility]    [id number]

418 Univers



        11:45:00 11:45:00                 MARITZA                         itjohn of



                                                                        [facility]

 

        2021 Orders          Doctor  STACEY    1.2.840.114 [id number]

19 



        00:00:00 00:00:00 Only            Unassigned, VANNESA   350.1.13.10       

  ity of



                                        [place] [facility] [id number]         Nikko

as



                                                        [phone]         [address]







Results







           Test Description Test Time  Test Comments Results    Result Comments 

Source









                    POCT GLUCOSE (AUTOMATED) 2021 13:45:02 









                      Test Item  Value      Reference Range Interpretation Comme

nts









             POCT GLU (test code = 3959385084) 87 mg/dL                   

      

 

             Lab Interpretation (test code = 11406-7) Normal                    

             



[facility] GLUCOSE (AUTOMATED)2021 13:45:02





             Test Item    Value        Reference Range Interpretation Comments

 

             POCT GLU (test code = 6952860106) 87 mg/dL                   

      

 

             Lab Interpretation (test code = Normal                             

    



             38712-4)                                            



[facility] [id number] 13:20:00





             Test Item    Value        Reference Range Interpretation Comments

 

             POCT Glu (age>30days) (test code = 87 mg/dL                  

       



             3342)                                               



[facility] [id number] 13:20:00





             Test Item    Value        Reference Range Interpretation Comments

 

             POCT Glu (age>30days) (test code = 87 mg/dL                  

       



             3342)                                               



[facility]

## 2021-12-20 NOTE — ER
Nurse's Notes                                                                                     

 [facility]                                                                 

Name: Timoteo Tran                                                                            

Age: 61 yrs                                                                                       

Sex: Male                                                                                         

: 1960                                                                                   

MRN: [id number]                                                                                   

Arrival Date: 2021                                                                          

Time: 12:52                                                                                       

Account#: [id number]                                                                            

Bed 6                                                                                             

Private MD: Catherine Hagen                                                            

Diagnosis: Hydronephrosis with renal and ureteral calculous obstruction                           

                                                                                                  

Presentation:                                                                                     

                                                                                             

13:24 Chief complaint: Patient states: Sent for CT by Dr. Escamilla and found 10 mm kidney stone   jl7 

      on right side with hydronephrosis, Dr. Bourne wants me evaluated in the ER and is          

      going to do surgery tomorrow. Coronavirus screen: At this time, the client does not         

      indicate any symptoms associated with coronavirus-19. Ebola Screen: No symptoms or          

      risks identified at this time. Initial Sepsis Screen: Does the patient meet any 2           

      criteria? No. Patient's initial sepsis screen is negative. Does the patient have a          

      suspected source of infection? No. Patient's initial sepsis screen is negative. Risk        

      Assessment: Do you want to hurt yourself or someone else? Patient reports no desire to      

      harm self or others. Onset of symptoms is unknown. Care prior to arrival: None.             

13:24 Method Of Arrival: Ambulatory                                                           [facility] 

13:24 Acuity: JOVITA 3                                                                           jl7 

                                                                                                  

Triage Assessment:                                                                                

13:27 General: Appears in no apparent distress. uncomfortable, Behavior is calm, cooperative, jl7 

      appropriate for age. Pain: Complains of pain in right low back Pain currently is 3 out      

      of 10 on a pain scale.                                                                      

                                                                                                  

Historical:                                                                                       

- Allergies:                                                                                      

13:27 No Known Allergies;                                                                     jl7 

- PMHx:                                                                                           

13:27 Diabetes mellitus; Hypertensive disorder; gout; Chronic left hip infection;             jl7 

- PSHx:                                                                                           

13:27 hip replacement, bilateral;                                                             jl7 

                                                                                                  

- Immunization history:: Adult Immunizations up to date, Client reports receiving the             

  2nd dose of the Covid vaccine, Moderna.                                                         

- Social history:: Smoking status: Patient denies any tobacco usage or history of.                

                                                                                                  

                                                                                                  

Screenin:11 Abuse screen: Denies threats or abuse. Denies injuries from another. Nutritional        jh5 

      screening: No deficits noted. Tuberculosis screening: No symptoms or risk factors           

      identified. Fall Risk IV access (20 points).                                                

                                                                                                  

Assessment:                                                                                       

16:11 General: Appears in no apparent distress. Pain: Complains of pain in left low back and  jh5 

      left mid back.                                                                              

                                                                                                  

Vital Signs:                                                                                      

13:24  / 90; Pulse 67; Resp 17; Temp 98.1; Pulse Ox 98% ; Weight 107.5 kg; Height 5 ft. jl7 

      6 in. (167.64 cm); Pain 3/10;                                                               

16:11  / 93; Pulse 72; Resp 18; Pulse Ox 97% ;                                          jh5 

18:09  / 82; Pulse 69; Resp 18; Pulse Ox 96% on R/A;                                    ha  

13:24 Body Mass Index 38.25 (107.50 kg, 167.64 cm)                                            jl7 

                                                                                                  

ED Course:                                                                                        

12:52 Patient arrived in ED.                                                                  am2 

12:52 Catherine Hagen MD is Private Physician.                                    am2 

13:27 Triage completed.                                                                       jl7 

13:27 Arm band placed on right wrist.                                                         jl7 

13:44 Naun Galindo PA is PHCP.                                                                cp  

13:44 Naun De Anda MD is Attending Physician.                                             cp  

14:10 Inserted saline lock: 20 gauge in right antecubital area, using aseptic technique.      dh4 

      Blood collected.                                                                            

14:22 XRAY Chest (1 view) In Process Unspecified.                                             EDMS

16:08 Aparna Solis, RACHEL is Primary Nurse.                                                     jh5 

16:11 Patient has correct armband on for positive identification. Placed in gown. Bed in low  jh5 

      position.                                                                                   

16:11 No provider procedures requiring assistance completed. Inserted saline lock:.           jh5 

16:21 XRAY KUB In Process Unspecified.                                                        EDMS

17:33 El Salas DO is Hospitalizing Provider.                                           cp  

18:01 Urine Microscopic Only Sent.                                                            mb7 

18:02 Urine Microscopic Only Sent.                                                            mb7 

18:26 Skip Bourne MD is Referral Physician.                                              la1 

18:26 Catherine Hagen MD is Referral Physician.                                   la1 

18:40 IV discontinued, intact.                                                                ha  

                                                                                                  

Administered Medications:                                                                         

18:08 Drug: Rocephin - (cefTRIAXone) 1 grams Route: IVPB; Infused Over: 30 mins; Site: right  jl7 

      antecubital;                                                                                

18:42 Follow up: Response: No adverse reaction; IV Status: Completed infusion                 ha  

                                                                                                  

                                                                                                  

Outcome:                                                                                          

17:33 Decision to Hospitalize by Provider.                                                    cp  

18:27 Discharge ordered by MD.                                                                la1 

18:40 Discharged to home ambulatory.                                                          ha  

18:40 Condition: good                                                                             

18:40 Discharge instructions given to patient.                                                    

18:48 Patient left the ED.                                                                    ha  

                                                                                                  

Signatures:                                                                                       

Dispatcher [employer]                           EDMS                                                 

Rex Guillen, PERLA-C                      FNP-Cla1                                                  

Naun Galindo PA PA cp Leal, Jahala, RN                        RN   jl7                                                  

Kelsey Marcos am                                                  

Mehul Whyte                                 4                                                  

Aparna Solis RN                       RN   jh5                                                  

Juany Alegria                               mb7                                                  

LoidaStageMary morataya                                                   

                                                                                                  

**************************************************************************************************

## 2021-12-20 NOTE — RAD REPORT
EXAM DESCRIPTION:  RAD - Chest Single View - 12/20/2021 2:22 pm

 

CLINICAL HISTORY:  kidney stone

 

TECHNIQUE:  AP portable chest image was obtained 12/20/2021 2:22 pm .

 

FINDINGS:  Lungs are clear. Heart and vasculature are normal. No measurable pleural effusion and no p
neumothorax. No acute bony abnormality seen. Numerous clips are present from prior right shoulder jodi
nguyễn. E faced acromial humeral joint space on the left may indicate chronic rotator cuff tear. No acu
te aortic findings suspected.

 

IMPRESSION:  No acute cardiopulmonary process.

## 2021-12-20 NOTE — RAD REPORT
EXAM DESCRIPTION:  RAD - Abdomen 1 View (KUB) - 12/20/2021 4:20 pm

 

CLINICAL HISTORY:  FLANK PAIN

 

COMPARISON:  Stone Protocol dated 12/13/2021

 

FINDINGS:  Bowel gas pattern is non-specific.  No obstruction, free air or pneumatosis. No abnormal s
tool volume in the colon.

 

No significant bony findings bilateral total hip prostheses in place. Lower lumbar degenerative feng
es are seen. Approximately 12 millimeter calcification in the left mid abdomen is identified believed
 to be the lower pole left renal nonobstructing calculus seen December 13. A 10 millimeter calcificat
ion medial mid right abdomen is believed to be the obstructing calculus seen December 13. Size and po
sition do not appear to have changed during the interval.

 

IMPRESSION:  Bilateral abdominal calculi are seen believed to be the obstructing right UPJ calculus a
nd nonobstructing lower pole left renal calculus detailed December 13.

 

Size and position of the calculi have not changed.

## 2021-12-20 NOTE — EDPHYS
Physician Documentation                                                                           

 [facility]                                                                 

Name: Timoteo Tran                                                                            

Age: 61 yrs                                                                                       

Sex: Male                                                                                         

: 1960                                                                                   

MRN: [id number]                                                                                   

Arrival Date: 2021                                                                          

Time: 12:52                                                                                       

Account#: [id number]                                                                            

Bed 6                                                                                             

Private MD: Catherine Hagen                                                            

ED Physician Naun De Anda                                                                      

HPI:                                                                                              

                                                                                             

14:05 This 61 yrs old Male presents to ER via Ambulatory with complaints of kidney stone.     cp  

14:05 The patient presents with pain that is acute, with no known mechanism of injury.        cp  

14:05 The symptoms are located in the right low back.                                         cp  

14:05 Onset: The symptoms/episode began/occurred 10 day(s) ago.                               cp  

14:05 The pain does not radiate. Associated signs and symptoms: Pertinent negatives:          cp  

      abdominal pain, constipation, dysuria, fever, incontinence, numbness, urinary               

      retention, weakness. The problem was sustained Patient reports he was diagnosed with        

      kidney stone that was obstructing right kidney about 1 week ago. Severity of symptoms:      

      in the emergency department the symptoms mild right low back pain.                          

14:05 Patient reports he was referred to ED by DR Escamilla for obstructed right kidney due to 10  cp  

      mm ureter stone.                                                                            

                                                                                                  

Historical:                                                                                       

- Allergies:                                                                                      

13:27 No Known Allergies;                                                                     jl7 

- PMHx:                                                                                           

13:27 Diabetes mellitus; Hypertensive disorder; gout; Chronic left hip infection;             jl7 

- PSHx:                                                                                           

13:27 hip replacement, bilateral;                                                             jl7 

                                                                                                  

- Immunization history:: Adult Immunizations up to date, Client reports receiving the             

  2nd dose of the Covid vaccine, Moderna.                                                         

- Social history:: Smoking status: Patient denies any tobacco usage or history of.                

                                                                                                  

                                                                                                  

ROS:                                                                                              

14:10 Back: Positive for pain at rest, right low back.                                        cp  

14:10 Eyes: Negative for injury, pain, redness, and discharge.                                cp  

14:10 Constitutional: Negative for body aches, chills, fever, poor PO intake.                     

14:10 Cardiovascular: Negative for chest pain.                                                    

14:10 Respiratory: Negative for cough, shortness of breath, wheezing.                             

14:10 Abdomen/GI: Negative for abdominal pain, nausea, vomiting, and diarrhea, anorexia.          

14:10 : Negative for urinary symptoms, testicular pain                                          

14:10 Neuro: Negative for altered mental status, headache, weakness.                              

14:10 All other systems are negative.                                                             

                                                                                                  

Exam:                                                                                             

14:15 Constitutional: The patient appears in no acute distress, alert, awake,                 cp  

      non-diaphoretic, non-toxic, well developed, well nourished, obese.                          

14:15 Head/Face:  Normocephalic, atraumatic.                                                  cp  

14:15 Eyes: Periorbital structures: appear normal, Conjunctiva: normal, no exudate, no            

      injection, Sclera: no appreciated abnormality, Lids and lashes: appear normal,              

      bilaterally.                                                                                

14:15 ENT: External ear(s): are unremarkable, Nose: is normal, Mouth: Lips: moist, Oral           

      mucosa: moist, Posterior pharynx: Airway: no evidence of obstruction, patent.               

14:15 Chest/axilla: Inspection: normal.                                                           

14:15 Cardiovascular: Rate: Rhythm: regular.                                                      

14:15 Respiratory: the patient does not display signs of respiratory distress,  Respirations:     

      normal, no use of accessory muscles, no retractions, labored breathing, is not present,     

      Breath sounds: are clear throughout, no decreased breath sounds, no stridor, no             

      wheezing.                                                                                   

14:15 Abdomen/GI: Inspection: abdomen appears normal, Bowel sounds: active, all quadrants,        

      Palpation: soft, in all quadrants, nontender, in all quadrants, voluntary guarding, is      

      not appreciated, involuntary guarding, is not appreciated.                                  

14:15 Back: pain, that is mild, of the  right low back, ROM is normal.                            

14:15 Neuro: Orientation: to person, place \T\ time. Mentation: is normal, Motor: moves all       

      fours, strength is normal, Sensation: is normal.                                            

16:30 ECG was reviewed by the Attending Physician.                                            cp  

                                                                                                  

Vital Signs:                                                                                      

13:24  / 90; Pulse 67; Resp 17; Temp 98.1; Pulse Ox 98% ; Weight 107.5 kg; Height 5 ft. jl7 

      6 in. (167.64 cm); Pain 3/10;                                                               

16:11  / 93; Pulse 72; Resp 18; Pulse Ox 97% ;                                          jh5 

18:09  / 82; Pulse 69; Resp 18; Pulse Ox 96% on R/A;                                    ha  

13:24 Body Mass Index 38.25 (107.50 kg, 167.64 cm)                                            jl7 

                                                                                                  

MDM:                                                                                              

15:27 Patient medically screened.                                                             cp  

16:00 Data reviewed: vital signs, nurses notes, lab test result(s), EKG, radiologic studies,  cp  

      plain films, I have discussed the patient's presentation/case with the attending            

      Emergency Department Physician; and as a result, I will admit patient.                      

16:00 Differential diagnosis: Pyelonephritis sepsis, acute kidney failure.                    cp  

16:23 Physician consultation: Skip Bourne MD was called at 16:15, regarding consult,       cp  

      patient's condition, left message on voicemail.                                             

16:35 Physician consultation: Skip Bourne MD was contacted at 16:35, regarding consult,    cp  

      patient's condition, reports will be able see patient tomorrow if admitted to services      

      to hospitalist.                                                                             

18:10 Physician consultation: Rex Guillen was contacted at 18:10, regarding consult, patient's cp  

      condition, and will see patient in ED, shortly.                                             

18:28 ED course: Case was discussed with neurology on-call as well as patient's nephrologist  mitzy 

      who are in agreement the patient's condition can be managed on an outpatient basis.         

      Patient is completely pain-free at this time renal function is intact. Patient              

      instructed to avoid all NSAIDs and return to the ER immediately for fever, severe pain      

      or any new/worsening symptoms. Patient given follow-up instructions for urology to call     

      in the next 1 to 2 days. Strict return precautions given. Patient agreeable with plan       

      of care..                                                                                   

                                                                                                  

                                                                                             

13:56 Order name: Basic Metabolic Panel; Complete Time: 15:27                                   

                                                                                             

15:27 Interpretation: Normal except: ; BUN 24; GFR 67.                                    

                                                                                             

13:56 Order name: CBC with Diff; Complete Time: 15:27                                           

                                                                                             

15:27 Interpretation: Normal except: RDW 16.4; MPV 6.8; EOSINOPHIL % 9.4.                       

                                                                                             

13:56 Order name: LFT's; Complete Time: 15:27                                                   

                                                                                             

13:56 Order name: Magnesium; Complete Time: 15:27                                               

                                                                                             

13:56 Order name: NT PRO-BNP; Complete Time: 15:27                                              

                                                                                             

13:56 Order name: PT-INR; Complete Time: 15:27                                                  

                                                                                             

13:56 Order name: Troponin (emerg Dept Use Only); Complete Time: 15:27                          

                                                                                             

13:56 Order name: XRAY Chest (1 view); Complete Time: 15:27                                   cp  

                                                                                             

13:56 Order name: EKG; Complete Time: 13:57                                                   cp  

                                                                                             

15:28 Order name: Urine Microscopic Only                                                      cp  

                                                                                             

15:29 Order name: Urine Microscopic Only; Complete Time: 17:59                                EDMS

                                                                                             

15:44 Order name: XRBRUNO AGARWALB; Complete Time: 17:02                                              cp  

                                                                                             

18:01 Order name: Urine Dipstick-Ancillary; Complete Time: 18:26                              EDMS

                                                                                             

13:56 Order name: Cardiac monitoring; Complete Time: 16:11                                    cp  

                                                                                             

13:56 Order name: EKG - Nurse/Tech; Complete Time: 16:39                                      cp  

                                                                                             

13:56 Order name: IV Saline Lock; Complete Time: 14:11                                        cp  

                                                                                             

13:56 Order name: Labs collected and sent; Complete Time: 14:11                               cp  

                                                                                             

13:56 Order name: O2 Per Protocol; Complete Time: 16:11                                       cp  

                                                                                             

13:56 Order name: O2 Sat Monitoring; Complete Time: 16:11                                     cp  

                                                                                             

15:28 Order name: Urine Dipstick-Ancillary (obtain specimen); Complete Time: 18:01            cp  

                                                                                                  

EC:30 Rate is 64 beats/min. Rhythm is regular. NM interval is normal. QRS interval is normal. cp  

      QT interval is normal. Interpreted by me. Reviewed by me.                                   

                                                                                                  

Administered Medications:                                                                         

18:08 Drug: Rocephin - (cefTRIAXone) 1 grams Route: IVPB; Infused Over: 30 mins; Site: right  jl7 

      antecubital;                                                                                

18:42 Follow up: Response: No adverse reaction; IV Status: Completed infusion                 ha  

                                                                                                  

                                                                                                  

Disposition Summary:                                                                              

21 18:27                                                                                    

Discharge Ordered                                                                                 

      Location: Home(21 18:27)                                                          la1 

      Problem: an ongoing problem(21 18:27)                                             la1 

      Symptoms: are unchanged(21 18:27)                                                 la1 

      Condition: Stable(21 18:27)                                                       la1 

      Diagnosis                                                                                   

        - Hydronephrosis with renal and ureteral calculous obstruction                        la1 

      Followup:                                                                               la1 

        - With: Skip Bounre MD                                                                

        - When: 1 - 2 days                                                                         

        - Reason: Continuance of care, Re-evaluation by your physician                             

      Followup:                                                                               la1 

        - With: Emergency Department                                                               

        - When: As needed                                                                          

        - Reason: Fever > 102 F, If symptoms return, Worsening of condition                        

      Followup:                                                                               la1 

        - With: Catherine Hagen MD                                                     

        - When: 10 - 14 days                                                                       

        - Reason: Re-evaluation by your physician                                                  

      Discharge Instructions:                                                                     

        - Discharge Summary Sheet                                                             la1 

        - Kidney Stones                                                                       la1 

        - Hydronephrosis                                                                      la1 

      Forms:                                                                                      

        - Medication Reconciliation Form                                                      la1 

        - Thank You Letter                                                                    la1 

Addendum:                                                                                         

2021                                                                                        

     18:51 Co-signature as Attending Physician, Naun De Anda MD I agree with the assessment and  c
ha

           plan of care.                                                                          

                                                                                                  

Signatures:                                                                                       

Dispatcher [employer]                           EDNaun Szymanski MD MD cha Attema, Lee, FNP-C                      FNP-Cla1                                                  

Naun Galindo PA                         PA   Gertrudis Reilly, RN                        RN   jl7                                                  

[facility]                                                   

                                                                                                  

Corrections: (The following items were deleted from the chart)                                    

                                                                                             

18:26 17:33 Observation cp                                                                    la1 

18:26 17:33 PreEl feliz cp                                                                 la1 

18:26 17:33 Telemetry/MedSurg (observation) cp                                                la1 

18:26 17:33 Stable cp                                                                         la1 

18:26 17:33 an ongoing problem cp                                                             la1 

18:26 17:33 are unchanged cp                                                                  la1 

18:26 17:33 Standard cp                                                                       la1 

18:26 17:33 cp                                                                                la1 

18:26 17:33 Calculus of kidney with calculus of ureter - right cp                             la1 

                                                                                                  

**************************************************************************************************

## 2021-12-21 NOTE — EKG
Test Date:    2021-12-20               Test Time:    16:22:05

Technician:   JEANETTE                                     

                                                     

MEASUREMENT RESULTS:                                       

Intervals:                                           

Rate:         64                                     

FL:           140                                    

QRSD:         98                                     

QT:           394                                    

QTc:          406                                    

Axis:                                                

P:            68                                     

FL:           140                                    

QRS:          -5                                     

T:            78                                     

                                                     

INTERPRETIVE STATEMENTS:                                       

                                                     

Normal sinus rhythm with sinus arrhythmia

Normal ECG

Compared to ECG 11/03/2003 11:02:00

No significant changes



Electronically Signed On 12-21-21 07:34:46 CST by Daniele Iyer

## 2021-12-23 ENCOUNTER — HOSPITAL ENCOUNTER (OUTPATIENT)
Dept: HOSPITAL 97 - OR | Age: 61
Discharge: HOME | End: 2021-12-23
Attending: UROLOGY
Payer: COMMERCIAL

## 2021-12-23 VITALS — OXYGEN SATURATION: 96 % | DIASTOLIC BLOOD PRESSURE: 66 MMHG | SYSTOLIC BLOOD PRESSURE: 124 MMHG | TEMPERATURE: 96.7 F

## 2021-12-23 DIAGNOSIS — N20.0: ICD-10-CM

## 2021-12-23 DIAGNOSIS — N26.1: Primary | ICD-10-CM

## 2021-12-23 DIAGNOSIS — N13.2: ICD-10-CM

## 2021-12-23 DIAGNOSIS — Z20.822: ICD-10-CM

## 2021-12-23 PROCEDURE — 74450 X-RAY URETHRA/BLADDER: CPT

## 2021-12-23 PROCEDURE — 0T768DZ DILATION OF RIGHT URETER WITH INTRALUMINAL DEVICE, VIA NATURAL OR ARTIFICIAL OPENING ENDOSCOPIC: ICD-10-PCS

## 2021-12-23 PROCEDURE — 82947 ASSAY GLUCOSE BLOOD QUANT: CPT

## 2021-12-23 PROCEDURE — 51610 INJECTION FOR BLADDER X-RAY: CPT

## 2021-12-23 PROCEDURE — 52332 CYSTOSCOPY AND TREATMENT: CPT

## 2021-12-23 NOTE — OP
Surgeon:  YAHAIRA HOWARD



Preoperative Diagnoses:  

1.Right-sided hydronephrosis.

2.Right renal atrophy.

3.Right 10 mm proximal ureterolithiasis.

4.Left 14 mm nephrolithiasis.



Postoperative Diagnoses:  

1.Right-sided hydronephrosis.

2.Right renal atrophy.

3.Right 10 mm proximal ureterolithiasis.

4.Left 14 mm nephrolithiasis.



Principal Procedures:  

1.Cystoscopy.

2.Right retrograde pyelography.

3.Right ureteral stent placement.



Indication For Procedure:  Mr. Tran presented to [facility]rology Clinic with a longstanding obstruction
 of his right kidney with evidence of atrophy associated with massive hydronephrosis and approximatel
y obstructing ureteral calculus.  It is unclear how long he has had this obstruction noting that he h
ad only minimal pain that he thought may have been a twisted nerve or something.  He presents today f
or definitive management to relieve the obstruction.



Procedure In Detail:  The patient was consented in the preoperative holding area before being transfe
rred to operative suite where general anesthesia was induced.  He was given Ancef 2 g IV antimicrobia
l prophylaxis and pneumo boots were provided for DVT prophylaxis.  He was placed in the lithotomy pos
ition, padded and secured to the table appropriately.  The case was begun after his genitalia was pre
pped using Hibiclens and draped in standard fashion, using a 22-German rigid cystoscope to traverse t
he urethra and into the bladder with ease.  The bladder was surveyed in its entirety, and there were 
no mucosal lesions, foreign bodies or stones noted throughout.  The ureteral orifices were orthotopic
 in location.  The right ureteral orifice was cannulated with a tip of a 5-German ureteral access cat
heter and a retrograde pyelogram was performed. 



Right retrograde pyelography: 

Using a 70:30 mixture of Omnipaque and saline, the contrast mixture was injected via the 5-German ure
teral access catheter and did propagate up a nondilated distal ureter before meeting a point of visib
le obstruction in the proximal ureter before wispy amounts of contrast did suppress it and enter a ve
ry massively dilated renal pelvis.  I was then able to navigate a Sensor wire with some resistance be
yond the stone and coiled it within the renal pelvis evident fluoroscopically. 



Over the Sensor wire, I then directly passed a 6-German by 24 cm double-J right ureteral stent with a
 coil observed fluoroscopically in the renal pelvis and 1 cystoscopically formed in the bladder.  The
 bladder was then decompressed of fluid and urine, and the patient was awakened from general anesthes
ia, transferred to a stretcher, and then transferred to the recovery room in good condition.



Complications:  None.



Discharge Disposition:  Follow up within 30 days of possible for definitive management via right uret
eroscopy and laser lithotripsy.  He may need a clinic based followup to discuss the risks and side ef
fect potential of the procedure.





SUSI/KRISTI

DD:  12/23/2021 08:06:48Voice ID:  [id number]

DT:  12/23/2021 08:44:30Report ID:  [id number]

## 2021-12-23 NOTE — RAD REPORT
EXAM DESCRIPTION:  RAD - Urethrocystogrphy Retrograde - 12/23/2021 8:07 am

 

CLINICAL HISTORY:  STENT WITH RETROGRADE

 

COMPARISON:  Stone Protocol dated 12/13/2021

 

FINDINGS/IMPRESSION:  Twelve intraoperative fluoroscopic images were submitted. Right-sided hydroneph
rosis again noted with stone at the right UPJ. A ureteral stent was placed.

 

Fluoro time: 13 seconds

## 2022-02-15 ENCOUNTER — HOSPITAL ENCOUNTER (OUTPATIENT)
Dept: HOSPITAL 97 - OR | Age: 62
Discharge: HOME | End: 2022-02-15
Attending: UROLOGY
Payer: COMMERCIAL

## 2022-02-15 VITALS — OXYGEN SATURATION: 97 % | SYSTOLIC BLOOD PRESSURE: 132 MMHG | DIASTOLIC BLOOD PRESSURE: 81 MMHG

## 2022-02-15 VITALS — TEMPERATURE: 97.4 F

## 2022-02-15 DIAGNOSIS — N20.2: Primary | ICD-10-CM

## 2022-02-15 DIAGNOSIS — N35.919: ICD-10-CM

## 2022-02-15 DIAGNOSIS — N13.5: ICD-10-CM

## 2022-02-15 DIAGNOSIS — Z20.822: ICD-10-CM

## 2022-02-15 PROCEDURE — 51610 INJECTION FOR BLADDER X-RAY: CPT

## 2022-02-15 PROCEDURE — 74450 X-RAY URETHRA/BLADDER: CPT

## 2022-02-15 PROCEDURE — 0TF68ZZ FRAGMENTATION IN RIGHT URETER, VIA NATURAL OR ARTIFICIAL OPENING ENDOSCOPIC: ICD-10-PCS

## 2022-02-15 PROCEDURE — 82947 ASSAY GLUCOSE BLOOD QUANT: CPT

## 2022-02-15 PROCEDURE — 87086 URINE CULTURE/COLONY COUNT: CPT

## 2022-02-15 PROCEDURE — 87088 URINE BACTERIA CULTURE: CPT

## 2022-02-15 PROCEDURE — 0T768DZ DILATION OF RIGHT URETER WITH INTRALUMINAL DEVICE, VIA NATURAL OR ARTIFICIAL OPENING ENDOSCOPIC: ICD-10-PCS

## 2022-02-15 PROCEDURE — 52356 CYSTO/URETERO W/LITHOTRIPSY: CPT

## 2022-02-15 RX ADMIN — HYDROMORPHONE HYDROCHLORIDE ONE MG: 1 INJECTION, SOLUTION INTRAMUSCULAR; INTRAVENOUS; SUBCUTANEOUS at 12:15

## 2022-02-15 RX ADMIN — HYDROMORPHONE HYDROCHLORIDE ONE MG: 1 INJECTION, SOLUTION INTRAMUSCULAR; INTRAVENOUS; SUBCUTANEOUS at 12:10

## 2022-02-15 NOTE — OP
Surgeon:  YAHAIRA HOWARD



Preoperative Diagnoses:  

1.10 mm right proximal ureterolithiasis.

2.Left nephrolithiasis.



Postoperative Diagnoses:  

1.10 mm right proximal ureterolithiasis.

2.Left nephrolithiasis.

3.Right ureteral stricture.

4.Meatal stenosis.



Principal Procedures:  

1.Cystoscopy with right ureteroscopy and laser lithotripsy and stent exchange.

2.Dilation of urethral meatus.

3.Laser incision of ureteral stricture.



Indication For Procedure:  Mr. Tran presented for stent placement several weeks ago due to a pr
olonged obstructing right proximal ureteral 10 mm calculus.  A stent was successfully placed, and he 
presents today for definitive management.



Procedure In Detail:  The patient was consented in the preoperative holding area before being transfe
rred to the operative suite where general anesthesia was induced.  He was given ampicillin 2 g and ge
ntamicin IV antimicrobial prophylaxis.  Pneumo boots were provided for DVT prophylaxis.  He was place
d in the lithotomy position, padded and secured to the table appropriately.  His genitalia was preppe
d using Hibiclens and draped in standard fashion.  The case was begun attempting to pass a 22-Kosovan 
rigid cystoscope, but this would not pass beyond his stenotic meatus.  As a result, his meatus was di
lated using sounds from 18-Kosovan to 24-Kosovan with ease.  I was then able to pass the 22-Kosovan cyst
oscope via the urethra into the bladder, which was decompressed of urine.  I then grasped the indwell
ing stent via the coil and delivered it to the meatus.  I passed up the stent through the proximal co
il, which was observed in the mid ureter, and the wire did coil in the putative collecting system on 
the right.  Over the wire, I passed a dual-lumen catheter into the mid distal ureter and performed a 
retrograde pyelogram. 



Right retrograde pyelography: 

Using a 70:30 mixture of Omnipaque and saline, contrast was injected via the second lumen of the dual
-lumen catheter and did propagate up the ureter before entering a somewhat dilated right renal pelvis
.  Thus, I also performed fluoroscopic imagery of the left side, and the known large left renal calcu
radha is eminently visible on fluoroscopy there as well.  As a result, I then removed the dual-lumen ca
theter and utilized the semi-rigid ureteroscope along with pressurized normal saline irrigation to tr
averse the urethra and into the bladder.  I then traversed the ureteral orifice and went up the urete
r until the stone was encountered.  Then, using a 365 nanometer laser fiber and a power setting of 0.
8 joules and between 8 and 15 hertz, I quickly fragmented the stone to dust or to fragment the size o
f the safety wire in place, less than 1-2 mm in diameter.  Once the stone was fragmented, because no 
additional calculi were in the kidney, I surveyed the area where the stone had impacted.  In that reg
ion, there was whitening of the mucosa from the prolonged impaction of the stone, and there was in th
e inferior medial border of the ureter, 1/2 of it, some annular urethral stricture only partially kelin
rowing the lumen about 1/3rd of the diameter.  As a result, I then utilized a laser fiber and changed
 the settings to 0.4 joules and 25 hertz and quickly incised that scarred area until the stricture wa
s widely patent.  I then removed the semi-rigid ureteroscope and again backloaded the cystoscope over
 the indwelling safety wire.  I then passed a 6-Kosovan x 26 cm double-J right ureteral stent into the
 kidney with a coil observed fluoroscopically within the renal pelvis and 1 cystoscopically observed 
in the bladder.  I then passed a 5-Kosovan ureteral access catheter into the ureteral orifice alongsid
e the stent and performed another right retrograde pyelography. 



Right retrograde pyelogram: 

Using a 70:30 mixture of Omnipaque and saline followed by full-strength contrast, I was able to demon
strate the appropriate location of the stent and the intact nature of the ureter with the exception o
f the strictured region that was incised where there was some expected and desired extramural extrava
sation in that region.  The stent was coiled appropriately in the kidney, however, so the 5-Kosovan ur
eteral access catheter was removed, his bladder was decompressed of fluid and urine, and the scope wa
s removed.  He was then taken out of lithotomy position, awakened from general anesthesia, transferre
d to a stretcher, and then transferred to the recovery room in good condition.



Complications:  None.



Discharge Disposition:  He should maintain the stent for the next 4-6 weeks before removal in the off
ice.  He also should be scheduled for left ESWL in the coming months given the imminent visibility of
 the large left nephrolithiasis present.  I have discharged him with 10 days of Bactrim double streng
th tablets once a day to take alongside the linezolid, which he takes regularly anyway.





SUSI/KRISTI

DD:  02/15/2022 12:15:18Voice ID:  [id number]

DT:  02/15/2022 23:20:04Report ID:  [id number]

## 2022-02-15 NOTE — RAD REPORT
EXAM DESCRIPTION:  RAD - Urethrocystogrphy Retrograde - 2/15/2022 11:47 am

 

FINDINGS:  There were 9 fluoroscopic KUB images obtained during fluoroscopic assisted right ureteral 
stent placement or repositioning. No suspicious or unexpected finding.

 

Fluoro time was 22 seconds. Cumulative dose was 14.2 mGy.

## 2022-05-19 LAB
BUN BLD-MCNC: 24 MG/DL (ref 7–18)
GLUCOSE SERPLBLD-MCNC: 107 MG/DL (ref 74–106)
HCT VFR BLD CALC: 37.4 % (ref 39.6–49)
LYMPHOCYTES # SPEC AUTO: 1.2 K/UL (ref 0.7–4.9)
PMV BLD: 6.6 FL (ref 7.6–11.3)
POTASSIUM SERPL-SCNC: 3.7 MMOL/L (ref 3.5–5.1)
RBC # BLD: 4.25 M/UL (ref 4.33–5.43)

## 2022-05-20 NOTE — EKG
Test Date:    2022-05-19               Test Time:    13:29:45

Technician:   VALENT[place]                                     

                                                     

MEASUREMENT RESULTS:                                       

Intervals:                                           

Rate:         55                                     

PA:           158                                    

QRSD:         98                                     

QT:           414                                    

QTc:          396                                    

Axis:                                                

P:            65                                     

PA:           158                                    

QRS:          8                                      

T:            73                                     

                                                     

INTERPRETIVE STATEMENTS:                                       

                                                     

Sinus bradycardia

Marked ST abnormality, possible septal subendocardial injury

Abnormal ECG

Compared to ECG 12/20/2021 16:22:05

ST (T wave) deviation now present

Sinus rhythm no longer present

Sinus arrhythmia no longer present



Electronically Signed On 05-20-22 10:58:36 CDT by Daniele Iyer

## 2022-05-24 ENCOUNTER — HOSPITAL ENCOUNTER (OUTPATIENT)
Dept: HOSPITAL 97 - OR | Age: 62
Discharge: HOME | End: 2022-05-24
Attending: UROLOGY
Payer: COMMERCIAL

## 2022-05-24 VITALS — SYSTOLIC BLOOD PRESSURE: 131 MMHG | DIASTOLIC BLOOD PRESSURE: 68 MMHG | OXYGEN SATURATION: 95 % | TEMPERATURE: 96 F

## 2022-05-24 DIAGNOSIS — N20.0: Primary | ICD-10-CM

## 2022-05-24 DIAGNOSIS — Z96.641: ICD-10-CM

## 2022-05-24 DIAGNOSIS — Z20.822: ICD-10-CM

## 2022-05-24 DIAGNOSIS — E11.9: ICD-10-CM

## 2022-05-24 DIAGNOSIS — I10: ICD-10-CM

## 2022-05-24 DIAGNOSIS — M10.9: ICD-10-CM

## 2022-05-24 PROCEDURE — 50590 FRAGMENTING OF KIDNEY STONE: CPT

## 2022-05-24 PROCEDURE — 87088 URINE BACTERIA CULTURE: CPT

## 2022-05-24 PROCEDURE — 0TP98DZ REMOVAL OF INTRALUMINAL DEVICE FROM URETER, VIA NATURAL OR ARTIFICIAL OPENING ENDOSCOPIC: ICD-10-PCS

## 2022-05-24 PROCEDURE — 36415 COLL VENOUS BLD VENIPUNCTURE: CPT

## 2022-05-24 PROCEDURE — 80048 BASIC METABOLIC PNL TOTAL CA: CPT

## 2022-05-24 PROCEDURE — 82947 ASSAY GLUCOSE BLOOD QUANT: CPT

## 2022-05-24 PROCEDURE — 93005 ELECTROCARDIOGRAM TRACING: CPT

## 2022-05-24 PROCEDURE — 85025 COMPLETE CBC W/AUTO DIFF WBC: CPT

## 2022-05-24 PROCEDURE — 52310 CYSTOSCOPY AND TREATMENT: CPT

## 2022-05-24 PROCEDURE — 0TF48ZZ FRAGMENTATION IN LEFT KIDNEY PELVIS, VIA NATURAL OR ARTIFICIAL OPENING ENDOSCOPIC: ICD-10-PCS

## 2022-05-24 PROCEDURE — 87086 URINE CULTURE/COLONY COUNT: CPT

## 2022-05-25 NOTE — OP
Surgeon:  YAHAIRA HOWARD



Preoperative Diagnoses:  

1.Left nephrolithiasis, 14 mm.

2.History of right ureteroscopy with laser lithotripsy for nephroureterolithiasis.



Postoperative Diagnoses:  

1.Left nephrolithiasis, 14 mm.

2.History of right ureteroscopy with laser lithotripsy for nephroureterolithiasis.



Procedures:  

1.Cystoscopy with right ureteral stent extraction.

2.Left extracorporeal shock wave lithotripsy of a 14 mm calculus.



Indication For Procedure:  Mr. Tran is a 61-year-old gentleman with hypertension, type 2 diabet
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
lid therapy who underwent ureteroscopy with laser lithotripsy for a right 10 mm obstructing UPJ calcu
radha with associated hydronephrosis and right renal atrophy.  Incidentally, he had left flank pain ass
ociated with a 14 mm nonobstructing left renal calculus.  After managing the right side with ureteros
copy and laser lithotripsy, we discussed management of the radio opaque left renal calculus using araecli
ck wave lithotripsy and I would remove the stent on the right side at that time.



Procedure In Detail:  The patient was consented in the preoperative holding area before being transfe
rred to the operative suite where general anesthesia was induced.  He was given Ancef 2 g IV antimicr
obial prophylaxis and Pneumoboots were provided for DVT prophylaxis.  He was placed supine on the pro
cedure table, padded and secured appropriately.  His genitalia were prepped using Hibiclens and a aurea
er bath was placed beneath his left flank.  The case was begun using a 16-Estonian flexible cystoscope 
to traverse the urethra and into the bladder with ease.  The stent was noted to emanate from the righ
t ureteral orifice, and using a flexible disposable alligator grasper, I grasped the stent and delive
red it via the meatus with both coils intact.  Simultaneously, shock wave lithotripsy was begun after
 the stone had been fluoroscopically targeted.  The stone was targeted with lithotripsy at power sett
ing between 1 and 7 over the course of 500 shocks before an additional 1750 shocks were delivered at 
1 hertz to try to fragment the stone.  The stone was incredibly dense, but did become somewhat dissol
utioned throughout the process, though the circumference of the stone remained somewhat intact.  Afte
r 2250 shocks had been delivered, we increased the power to 9 and gave the patient an additional 750 
shocks for a total of 3000 shocks in order to more completely dissolve the stone.  In the end, the ci
rcumference of the stone was also disrupted suggesting the stone had been somewhat fragmented.  As a 
result, the procedure was concluded, and the patient was awakened from general anesthesia before bein
g transferred to a stretcher and then to the recovery room in good condition.



Complications:  None apparent.



Discharge Disposition:  He will follow up in the Urology Clinic in about 2-3 months' time, but in the
 meantime, he will strain his urine for the stone dust and bring it for chemical analysis.  About a m
onth after the procedure, he may also perform a Litholink 2 x 24 hour urine study to assess his metab
olic stone forming potential.  Subsequent followup should be at least a month after the Litholink is 
completed so that the results are available and we can determine the next steps in management of his 
large volume recurrent stone formation.





SUSI/KRISTI

DD:  05/24/2022 14:13:36Voice ID:  [id number]

DT:  05/24/2022 23:02:32Report ID:  [id number]